# Patient Record
Sex: MALE | Race: OTHER | NOT HISPANIC OR LATINO | URBAN - METROPOLITAN AREA
[De-identification: names, ages, dates, MRNs, and addresses within clinical notes are randomized per-mention and may not be internally consistent; named-entity substitution may affect disease eponyms.]

---

## 2023-11-19 ENCOUNTER — EMERGENCY (EMERGENCY)
Facility: HOSPITAL | Age: 58
LOS: 1 days | Discharge: HOME | End: 2023-11-19
Admitting: EMERGENCY MEDICINE
Payer: SELF-PAY

## 2023-11-19 VITALS
DIASTOLIC BLOOD PRESSURE: 89 MMHG | TEMPERATURE: 98 F | WEIGHT: 259.93 LBS | HEART RATE: 88 BPM | OXYGEN SATURATION: 95 % | SYSTOLIC BLOOD PRESSURE: 188 MMHG | RESPIRATION RATE: 18 BRPM

## 2023-11-19 PROCEDURE — 99283 EMERGENCY DEPT VISIT LOW MDM: CPT

## 2023-11-19 PROCEDURE — 99053 MED SERV 10PM-8AM 24 HR FAC: CPT

## 2023-11-19 NOTE — ED PROVIDER NOTE - OBJECTIVE STATEMENT
59 yo male triaged for "pt. c/o pain and wound to the left calf as well as constant abdominal pain. Pt. was discharged this morning with diagnose of hernia." patient sleeping upon my evaluation, poorly cooperative. patient offers no complaint and instead turns around to sleep on stretcher. he looks well. he has an umbilical hernia to abdomen that is reducible and nontender. he is also wearing many wrist bands and states he is homeless, he is asking to rest and sleep.

## 2023-11-19 NOTE — ED ADULT TRIAGE NOTE - CHIEF COMPLAINT QUOTE
pt. c/o pain and wound to the left calf as well as constant abdominal pain. Pt. was discharged this morning with diagnose of hernia.

## 2023-11-19 NOTE — ED PROVIDER NOTE - PHYSICAL EXAMINATION
CONSTITUTIONAL: Well-appearing; well-nourished; in no apparent distress. patient poorly cooperative, sleeping through most of evaluation  	HEAD: Normocephalic; atraumatic.   	EYES:  clear bilaterally  ENT: airway patent  Resp breathing comfortably with no distress  Abdomen +umbilical hernia reducible, nontender, abdomen nondistended.   PSYCHOLOGICAL: The patient’s mood and manner are appropriate.

## 2023-11-19 NOTE — ED PROVIDER NOTE - PATIENT PORTAL LINK FT
You can access the FollowMyHealth Patient Portal offered by [facility] by registering at the following website: [email or web address]. By joining [employer]’s FollowMyHealth portal, you will also be able to view your health information using other applications (apps) compatible with our system.

## 2023-11-19 NOTE — ED ADULT NURSE NOTE - OBJECTIVE STATEMENT
59 y/o male here for eval of left calf pain. patient endorses he is also c/o abdominal pain w/ recent discharge and diagnosis of hernia. patient is alert verbal oriented x3

## 2023-11-19 NOTE — ED PROVIDER NOTE - NSFOLLOWUPINSTRUCTIONS_ED_ALL_ED_FT
Follow up with your primary care doctor or clinics listed below if you do not have a doctor  [address] [address]  To make an appointment, call [phone]  [facility], [place], operated [facility]  Address: [address] [place] [id number]  Appointment Center: [phone] ([phone])    Return for any concerns.

## 2023-11-19 NOTE — ED PROVIDER NOTE - CLINICAL SUMMARY MEDICAL DECISION MAKING FREE TEXT BOX
patient poorly cooperative, turns around in stretcher to sleep, he is asking to rest, abdomen soft nontender. +umbilical hernia reducible. advised to f/u primary care doctor.

## 2023-11-20 DIAGNOSIS — Z87.19 PERSONAL HISTORY OF OTHER DISEASES OF THE DIGESTIVE SYSTEM: ICD-10-CM

## 2023-11-20 DIAGNOSIS — R10.9 UNSPECIFIED ABDOMINAL PAIN: ICD-10-CM

## 2023-11-20 DIAGNOSIS — Z59.00 HOMELESSNESS UNSPECIFIED: ICD-10-CM

## 2023-11-20 SDOH — ECONOMIC STABILITY - HOUSING INSECURITY: HOMELESSNESS UNSPECIFIED: Z59.00
